# Patient Record
Sex: MALE | Race: WHITE | URBAN - METROPOLITAN AREA
[De-identification: names, ages, dates, MRNs, and addresses within clinical notes are randomized per-mention and may not be internally consistent; named-entity substitution may affect disease eponyms.]

---

## 2017-01-01 ENCOUNTER — APPOINTMENT (OUTPATIENT)
Dept: GENERAL RADIOLOGY | Facility: CLINIC | Age: 47
DRG: 441 | End: 2017-01-01
Attending: INTERNAL MEDICINE
Payer: COMMERCIAL

## 2017-01-01 ENCOUNTER — APPOINTMENT (OUTPATIENT)
Dept: GENERAL RADIOLOGY | Facility: CLINIC | Age: 47
DRG: 441 | End: 2017-01-01
Attending: EMERGENCY MEDICINE
Payer: COMMERCIAL

## 2017-01-01 ENCOUNTER — APPOINTMENT (OUTPATIENT)
Dept: ULTRASOUND IMAGING | Facility: CLINIC | Age: 47
DRG: 441 | End: 2017-01-01
Attending: INTERNAL MEDICINE
Payer: COMMERCIAL

## 2017-01-01 ENCOUNTER — APPOINTMENT (OUTPATIENT)
Dept: CARDIOLOGY | Facility: CLINIC | Age: 47
DRG: 441 | End: 2017-01-01
Attending: INTERNAL MEDICINE
Payer: COMMERCIAL

## 2017-01-01 ENCOUNTER — HOSPITAL ENCOUNTER (INPATIENT)
Facility: CLINIC | Age: 47
LOS: 1 days | DRG: 441 | End: 2017-02-22
Attending: EMERGENCY MEDICINE | Admitting: SURGERY
Payer: COMMERCIAL

## 2017-01-01 ENCOUNTER — APPOINTMENT (OUTPATIENT)
Dept: CT IMAGING | Facility: CLINIC | Age: 47
DRG: 441 | End: 2017-01-01
Attending: EMERGENCY MEDICINE
Payer: COMMERCIAL

## 2017-01-01 VITALS
SYSTOLIC BLOOD PRESSURE: 123 MMHG | DIASTOLIC BLOOD PRESSURE: 62 MMHG | TEMPERATURE: 93.4 F | RESPIRATION RATE: 33 BRPM | WEIGHT: 304.9 LBS | OXYGEN SATURATION: 38 %

## 2017-01-01 DIAGNOSIS — E86.0 DEHYDRATION: ICD-10-CM

## 2017-01-01 DIAGNOSIS — R09.2 RESPIRATORY ARREST (H): ICD-10-CM

## 2017-01-01 DIAGNOSIS — K72.91: ICD-10-CM

## 2017-01-01 DIAGNOSIS — I46.9 CARDIAC ARREST (H): ICD-10-CM

## 2017-01-01 DIAGNOSIS — K92.0 GASTROINTESTINAL HEMORRHAGE WITH HEMATEMESIS: ICD-10-CM

## 2017-01-01 DIAGNOSIS — N17.9 ACUTE KIDNEY INJURY (H): ICD-10-CM

## 2017-01-01 DIAGNOSIS — D64.9 ANEMIA, UNSPECIFIED TYPE: ICD-10-CM

## 2017-01-01 LAB
ABO + RH BLD: NORMAL
ABO + RH BLD: NORMAL
ALBUMIN SERPL-MCNC: 1.9 G/DL (ref 3.4–5)
ALBUMIN SERPL-MCNC: 2.2 G/DL (ref 3.4–5)
ALBUMIN UR-MCNC: 30 MG/DL
ALP SERPL-CCNC: 319 U/L (ref 40–150)
ALP SERPL-CCNC: 325 U/L (ref 40–150)
ALT SERPL W P-5'-P-CCNC: 108 U/L (ref 0–70)
ALT SERPL W P-5'-P-CCNC: 53 U/L (ref 0–70)
AMMONIA PLAS-SCNC: 81 UMOL/L (ref 10–50)
AMPHETAMINES UR QL SCN: NORMAL
ANION GAP SERPL CALCULATED.3IONS-SCNC: 25 MMOL/L (ref 3–14)
ANION GAP SERPL CALCULATED.3IONS-SCNC: 29 MMOL/L (ref 3–14)
APAP SERPL-MCNC: NORMAL MG/L (ref 10–20)
APPEARANCE UR: ABNORMAL
APTT PPP: 47 SEC (ref 22–37)
APTT PPP: 56 SEC (ref 22–37)
AST SERPL W P-5'-P-CCNC: 1556 U/L (ref 0–45)
AST SERPL W P-5'-P-CCNC: 551 U/L (ref 0–45)
BARBITURATES UR QL: NORMAL
BASE DEFICIT BLDA-SCNC: 21 MMOL/L
BASE DEFICIT BLDA-SCNC: 21.4 MMOL/L
BASE DEFICIT BLDA-SCNC: 22 MMOL/L
BASOPHILS # BLD AUTO: 0.1 10E9/L (ref 0–0.2)
BASOPHILS NFR BLD AUTO: 0.6 %
BENZODIAZ UR QL: NORMAL
BILIRUB SERPL-MCNC: 21.2 MG/DL (ref 0.2–1.3)
BILIRUB SERPL-MCNC: 22.5 MG/DL (ref 0.2–1.3)
BILIRUB UR QL STRIP: ABNORMAL
BLD GP AB SCN SERPL QL: NORMAL
BLD PROD TYP BPU: NORMAL
BLD UNIT ID BPU: 0
BLD UNIT ID BPU: 0
BLOOD BANK CMNT PATIENT-IMP: NORMAL
BLOOD PRODUCT CODE: NORMAL
BLOOD PRODUCT CODE: NORMAL
BPU ID: NORMAL
BPU ID: NORMAL
BUN SERPL-MCNC: 43 MG/DL (ref 7–30)
BUN SERPL-MCNC: 46 MG/DL (ref 7–30)
CA-I BLD-MCNC: 3.7 MG/DL (ref 4.4–5.2)
CALCIUM SERPL-MCNC: 6.7 MG/DL (ref 8.5–10.1)
CALCIUM SERPL-MCNC: 7.4 MG/DL (ref 8.5–10.1)
CANNABINOIDS UR QL SCN: NORMAL
CHLORIDE SERPL-SCNC: 86 MMOL/L (ref 94–109)
CHLORIDE SERPL-SCNC: 91 MMOL/L (ref 94–109)
CK SERPL-CCNC: 548 U/L (ref 30–300)
CO2 SERPL-SCNC: 14 MMOL/L (ref 20–32)
CO2 SERPL-SCNC: 8 MMOL/L (ref 20–32)
COCAINE UR QL: NORMAL
COLOR UR AUTO: ABNORMAL
CREAT SERPL-MCNC: 2.9 MG/DL (ref 0.66–1.25)
CREAT SERPL-MCNC: 3.2 MG/DL (ref 0.66–1.25)
DIFFERENTIAL METHOD BLD: ABNORMAL
EOSINOPHIL # BLD AUTO: 0 10E9/L (ref 0–0.7)
EOSINOPHIL NFR BLD AUTO: 0 %
ERYTHROCYTE [DISTWIDTH] IN BLOOD BY AUTOMATED COUNT: 17.2 % (ref 10–15)
ERYTHROCYTE [DISTWIDTH] IN BLOOD BY AUTOMATED COUNT: 18.4 % (ref 10–15)
ETHANOL SERPL-MCNC: 0.3 G/DL
FIBRINOGEN PPP-MCNC: 583 MG/DL (ref 200–420)
GFR SERPL CREATININE-BSD FRML MDRD: 21 ML/MIN/1.7M2
GFR SERPL CREATININE-BSD FRML MDRD: 24 ML/MIN/1.7M2
GLUCOSE BLDC GLUCOMTR-MCNC: 140 MG/DL (ref 70–99)
GLUCOSE BLDC GLUCOMTR-MCNC: 82 MG/DL (ref 70–99)
GLUCOSE BLDC GLUCOMTR-MCNC: 90 MG/DL (ref 70–99)
GLUCOSE SERPL-MCNC: 73 MG/DL (ref 70–99)
GLUCOSE SERPL-MCNC: 77 MG/DL (ref 70–99)
GLUCOSE UR STRIP-MCNC: 30 MG/DL
HCO3 BLD-SCNC: 10 MMOL/L (ref 21–28)
HCO3 BLD-SCNC: 7 MMOL/L (ref 21–28)
HCO3 BLD-SCNC: 8 MMOL/L (ref 21–28)
HCT VFR BLD AUTO: 26.1 % (ref 40–53)
HCT VFR BLD AUTO: 28.1 % (ref 40–53)
HGB BLD-MCNC: 8.3 G/DL (ref 13.3–17.7)
HGB BLD-MCNC: 8.5 G/DL (ref 13.3–17.7)
HGB BLD-MCNC: 8.7 G/DL (ref 13.3–17.7)
HGB UR QL STRIP: ABNORMAL
HYALINE CASTS #/AREA URNS LPF: 2 /LPF (ref 0–2)
IMM GRANULOCYTES # BLD: 0.2 10E9/L (ref 0–0.4)
IMM GRANULOCYTES NFR BLD: 1.4 %
INR PPP: 1.58 (ref 0.86–1.14)
INR PPP: 1.83 (ref 0.86–1.14)
KETONES BLD-SCNC: 3.4 MMOL/L (ref 0–0.6)
KETONES UR STRIP-MCNC: 10 MG/DL
LACTATE BLD-SCNC: 17 MMOL/L (ref 0.7–2.1)
LACTATE BLD-SCNC: 17 MMOL/L (ref 0.7–2.1)
LACTATE BLD-SCNC: 19 MMOL/L (ref 0.7–2.1)
LEUKOCYTE ESTERASE UR QL STRIP: ABNORMAL
LIPASE SERPL-CCNC: 3641 U/L (ref 73–393)
LYMPHOCYTES # BLD AUTO: 1.1 10E9/L (ref 0.8–5.3)
LYMPHOCYTES NFR BLD AUTO: 7 %
MAGNESIUM SERPL-MCNC: 3.1 MG/DL (ref 1.6–2.3)
MCH RBC QN AUTO: 34.8 PG (ref 26.5–33)
MCH RBC QN AUTO: 35.6 PG (ref 26.5–33)
MCHC RBC AUTO-ENTMCNC: 31 G/DL (ref 31.5–36.5)
MCHC RBC AUTO-ENTMCNC: 31.8 G/DL (ref 31.5–36.5)
MCV RBC AUTO: 112 FL (ref 78–100)
MCV RBC AUTO: 112 FL (ref 78–100)
MONOCYTES # BLD AUTO: 0.6 10E9/L (ref 0–1.3)
MONOCYTES NFR BLD AUTO: 3.5 %
MUCOUS THREADS #/AREA URNS LPF: PRESENT /LPF
NEUTROPHILS # BLD AUTO: 13.9 10E9/L (ref 1.6–8.3)
NEUTROPHILS NFR BLD AUTO: 87.5 %
NITRATE UR QL: NEGATIVE
NT-PROBNP SERPL-MCNC: 274 PG/ML (ref 0–450)
NUM BPU REQUESTED: 2
O2/TOTAL GAS SETTING VFR VENT: 100 %
OPIATES UR QL SCN: NORMAL
OXYHGB MFR BLD: 78 % (ref 92–100)
OXYHGB MFR BLD: 98 % (ref 92–100)
OXYHGB MFR BLD: 99 % (ref 92–100)
PCO2 BLD: 26 MM HG (ref 35–45)
PCO2 BLD: 33 MM HG (ref 35–45)
PCO2 BLD: 58 MM HG (ref 35–45)
PCP UR QL SCN: NORMAL
PH BLD: 6.85 PH (ref 7.35–7.45)
PH BLD: 6.97 PH (ref 7.35–7.45)
PH BLD: 7.05 PH (ref 7.35–7.45)
PH UR STRIP: 5.5 PH (ref 5–7)
PHOSPHATE SERPL-MCNC: 7.3 MG/DL (ref 2.5–4.5)
PLATELET # BLD AUTO: 132 10E9/L (ref 150–450)
PLATELET # BLD AUTO: 164 10E9/L (ref 150–450)
PO2 BLD: 170 MM HG (ref 80–105)
PO2 BLD: 268 MM HG (ref 80–105)
PO2 BLD: 82 MM HG (ref 80–105)
POTASSIUM SERPL-SCNC: 5 MMOL/L (ref 3.4–5.3)
POTASSIUM SERPL-SCNC: 5.9 MMOL/L (ref 3.4–5.3)
PROT SERPL-MCNC: 5.2 G/DL (ref 6.8–8.8)
PROT SERPL-MCNC: 5.5 G/DL (ref 6.8–8.8)
RBC # BLD AUTO: 2.33 10E12/L (ref 4.4–5.9)
RBC # BLD AUTO: 2.5 10E12/L (ref 4.4–5.9)
RBC #/AREA URNS AUTO: 15 /HPF (ref 0–2)
SALICYLATES SERPL-MCNC: NORMAL MG/DL
SODIUM SERPL-SCNC: 125 MMOL/L (ref 133–144)
SODIUM SERPL-SCNC: 128 MMOL/L (ref 133–144)
SP GR UR STRIP: 1.02 (ref 1–1.03)
SPECIMEN EXP DATE BLD: NORMAL
SQUAMOUS #/AREA URNS AUTO: 1 /HPF (ref 0–1)
TRANSFUSION STATUS PATIENT QL: NORMAL
TROPONIN I SERPL-MCNC: 0.04 UG/L (ref 0–0.04)
TROPONIN I SERPL-MCNC: 0.08 UG/L (ref 0–0.04)
URN SPEC COLLECT METH UR: ABNORMAL
UROBILINOGEN UR STRIP-MCNC: 2 MG/DL (ref 0–2)
WBC # BLD AUTO: 12 10E9/L (ref 4–11)
WBC # BLD AUTO: 15.9 10E9/L (ref 4–11)
WBC #/AREA URNS AUTO: <1 /HPF (ref 0–2)

## 2017-01-01 PROCEDURE — 87186 SC STD MICRODIL/AGAR DIL: CPT | Performed by: INTERNAL MEDICINE

## 2017-01-01 PROCEDURE — 80329 ANALGESICS NON-OPIOID 1 OR 2: CPT | Performed by: EMERGENCY MEDICINE

## 2017-01-01 PROCEDURE — 85027 COMPLETE CBC AUTOMATED: CPT | Performed by: INTERNAL MEDICINE

## 2017-01-01 PROCEDURE — 86900 BLOOD TYPING SEROLOGIC ABO: CPT | Performed by: EMERGENCY MEDICINE

## 2017-01-01 PROCEDURE — 71010 XR CHEST 1 VW: CPT

## 2017-01-01 PROCEDURE — 40000264 ECHO LIMITED WITH LUMASON

## 2017-01-01 PROCEDURE — 82805 BLOOD GASES W/O2 SATURATION: CPT | Performed by: EMERGENCY MEDICINE

## 2017-01-01 PROCEDURE — 93308 TTE F-UP OR LMTD: CPT | Mod: 26 | Performed by: INTERNAL MEDICINE

## 2017-01-01 PROCEDURE — 86850 RBC ANTIBODY SCREEN: CPT | Performed by: EMERGENCY MEDICINE

## 2017-01-01 PROCEDURE — 70450 CT HEAD/BRAIN W/O DYE: CPT

## 2017-01-01 PROCEDURE — 36556 INSERT NON-TUNNEL CV CATH: CPT | Performed by: INTERNAL MEDICINE

## 2017-01-01 PROCEDURE — 87181 SC STD AGAR DILUTION PER AGT: CPT | Performed by: INTERNAL MEDICINE

## 2017-01-01 PROCEDURE — 84100 ASSAY OF PHOSPHORUS: CPT | Performed by: INTERNAL MEDICINE

## 2017-01-01 PROCEDURE — 82330 ASSAY OF CALCIUM: CPT | Performed by: INTERNAL MEDICINE

## 2017-01-01 PROCEDURE — 76775 US EXAM ABDO BACK WALL LIM: CPT

## 2017-01-01 PROCEDURE — 40000940 XR CHEST PORT 1 VW

## 2017-01-01 PROCEDURE — 82140 ASSAY OF AMMONIA: CPT | Performed by: EMERGENCY MEDICINE

## 2017-01-01 PROCEDURE — 5A1935Z RESPIRATORY VENTILATION, LESS THAN 24 CONSECUTIVE HOURS: ICD-10-PCS | Performed by: EMERGENCY MEDICINE

## 2017-01-01 PROCEDURE — 40000275 ZZH STATISTIC RCP TIME EA 10 MIN

## 2017-01-01 PROCEDURE — 93005 ELECTROCARDIOGRAM TRACING: CPT

## 2017-01-01 PROCEDURE — 86923 COMPATIBILITY TEST ELECTRIC: CPT | Performed by: EMERGENCY MEDICINE

## 2017-01-01 PROCEDURE — 25000125 ZZHC RX 250

## 2017-01-01 PROCEDURE — 99292 CRITICAL CARE ADDL 30 MIN: CPT | Performed by: INTERNAL MEDICINE

## 2017-01-01 PROCEDURE — 93010 ELECTROCARDIOGRAM REPORT: CPT | Performed by: INTERNAL MEDICINE

## 2017-01-01 PROCEDURE — 25000132 ZZH RX MED GY IP 250 OP 250 PS 637: Performed by: INTERNAL MEDICINE

## 2017-01-01 PROCEDURE — 76705 ECHO EXAM OF ABDOMEN: CPT

## 2017-01-01 PROCEDURE — 93325 DOPPLER ECHO COLOR FLOW MAPG: CPT | Mod: 26 | Performed by: INTERNAL MEDICINE

## 2017-01-01 PROCEDURE — 25000131 ZZH RX MED GY IP 250 OP 636 PS 637: Performed by: INTERNAL MEDICINE

## 2017-01-01 PROCEDURE — 87800 DETECT AGNT MULT DNA DIREC: CPT | Performed by: INTERNAL MEDICINE

## 2017-01-01 PROCEDURE — 84484 ASSAY OF TROPONIN QUANT: CPT | Performed by: INTERNAL MEDICINE

## 2017-01-01 PROCEDURE — 82805 BLOOD GASES W/O2 SATURATION: CPT | Performed by: INTERNAL MEDICINE

## 2017-01-01 PROCEDURE — 20000003 ZZH R&B ICU

## 2017-01-01 PROCEDURE — 25000128 H RX IP 250 OP 636: Performed by: INTERNAL MEDICINE

## 2017-01-01 PROCEDURE — 85610 PROTHROMBIN TIME: CPT | Performed by: INTERNAL MEDICINE

## 2017-01-01 PROCEDURE — 80320 DRUG SCREEN QUANTALCOHOLS: CPT | Performed by: EMERGENCY MEDICINE

## 2017-01-01 PROCEDURE — 83690 ASSAY OF LIPASE: CPT | Performed by: INTERNAL MEDICINE

## 2017-01-01 PROCEDURE — 36620 INSERTION CATHETER ARTERY: CPT | Performed by: INTERNAL MEDICINE

## 2017-01-01 PROCEDURE — P9016 RBC LEUKOCYTES REDUCED: HCPCS | Performed by: EMERGENCY MEDICINE

## 2017-01-01 PROCEDURE — 40000276 ZZH STATISTIC RCP TIME ED VENT EA 10 MIN

## 2017-01-01 PROCEDURE — 85384 FIBRINOGEN ACTIVITY: CPT | Performed by: INTERNAL MEDICINE

## 2017-01-01 PROCEDURE — 25000128 H RX IP 250 OP 636: Performed by: SURGERY

## 2017-01-01 PROCEDURE — 80053 COMPREHEN METABOLIC PANEL: CPT | Performed by: INTERNAL MEDICINE

## 2017-01-01 PROCEDURE — 25000125 ZZHC RX 250: Performed by: INTERNAL MEDICINE

## 2017-01-01 PROCEDURE — 84484 ASSAY OF TROPONIN QUANT: CPT | Performed by: EMERGENCY MEDICINE

## 2017-01-01 PROCEDURE — 25000125 ZZHC RX 250: Performed by: EMERGENCY MEDICINE

## 2017-01-01 PROCEDURE — 99291 CRITICAL CARE FIRST HOUR: CPT | Performed by: INTERNAL MEDICINE

## 2017-01-01 PROCEDURE — 25000128 H RX IP 250 OP 636: Performed by: EMERGENCY MEDICINE

## 2017-01-01 PROCEDURE — 74020 XR ABDOMEN PORT 2 VW: CPT

## 2017-01-01 PROCEDURE — 94002 VENT MGMT INPAT INIT DAY: CPT

## 2017-01-01 PROCEDURE — 36600 WITHDRAWAL OF ARTERIAL BLOOD: CPT

## 2017-01-01 PROCEDURE — 87076 CULTURE ANAEROBE IDENT EACH: CPT | Performed by: INTERNAL MEDICINE

## 2017-01-01 PROCEDURE — 99291 CRITICAL CARE FIRST HOUR: CPT | Mod: 25 | Performed by: INTERNAL MEDICINE

## 2017-01-01 PROCEDURE — 85025 COMPLETE CBC W/AUTO DIFF WBC: CPT | Performed by: EMERGENCY MEDICINE

## 2017-01-01 PROCEDURE — 87040 BLOOD CULTURE FOR BACTERIA: CPT | Performed by: INTERNAL MEDICINE

## 2017-01-01 PROCEDURE — 83880 ASSAY OF NATRIURETIC PEPTIDE: CPT | Performed by: EMERGENCY MEDICINE

## 2017-01-01 PROCEDURE — 93321 DOPPLER ECHO F-UP/LMTD STD: CPT | Mod: 26 | Performed by: INTERNAL MEDICINE

## 2017-01-01 PROCEDURE — 93308 TTE F-UP OR LMTD: CPT

## 2017-01-01 PROCEDURE — 87077 CULTURE AEROBIC IDENTIFY: CPT | Performed by: INTERNAL MEDICINE

## 2017-01-01 PROCEDURE — 83735 ASSAY OF MAGNESIUM: CPT | Performed by: INTERNAL MEDICINE

## 2017-01-01 PROCEDURE — 80053 COMPREHEN METABOLIC PANEL: CPT | Performed by: EMERGENCY MEDICINE

## 2017-01-01 PROCEDURE — 81001 URINALYSIS AUTO W/SCOPE: CPT | Performed by: EMERGENCY MEDICINE

## 2017-01-01 PROCEDURE — 82010 KETONE BODYS QUAN: CPT | Performed by: EMERGENCY MEDICINE

## 2017-01-01 PROCEDURE — 00000146 ZZHCL STATISTIC GLUCOSE BY METER IP

## 2017-01-01 PROCEDURE — 85730 THROMBOPLASTIN TIME PARTIAL: CPT | Performed by: INTERNAL MEDICINE

## 2017-01-01 PROCEDURE — 82550 ASSAY OF CK (CPK): CPT | Performed by: INTERNAL MEDICINE

## 2017-01-01 PROCEDURE — 86901 BLOOD TYPING SEROLOGIC RH(D): CPT | Performed by: EMERGENCY MEDICINE

## 2017-01-01 PROCEDURE — 80307 DRUG TEST PRSMV CHEM ANLYZR: CPT | Performed by: EMERGENCY MEDICINE

## 2017-01-01 PROCEDURE — 85018 HEMOGLOBIN: CPT | Performed by: EMERGENCY MEDICINE

## 2017-01-01 PROCEDURE — 83605 ASSAY OF LACTIC ACID: CPT | Performed by: INTERNAL MEDICINE

## 2017-01-01 PROCEDURE — 85730 THROMBOPLASTIN TIME PARTIAL: CPT | Performed by: EMERGENCY MEDICINE

## 2017-01-01 PROCEDURE — 40000008 ZZH STATISTIC AIRWAY CARE

## 2017-01-01 PROCEDURE — 25500064 ZZH RX 255 OP 636: Performed by: SURGERY

## 2017-01-01 PROCEDURE — 85610 PROTHROMBIN TIME: CPT | Performed by: EMERGENCY MEDICINE

## 2017-01-01 PROCEDURE — 83605 ASSAY OF LACTIC ACID: CPT | Performed by: EMERGENCY MEDICINE

## 2017-01-01 RX ORDER — PIPERACILLIN SODIUM, TAZOBACTAM SODIUM 4; .5 G/20ML; G/20ML
4.5 INJECTION, POWDER, LYOPHILIZED, FOR SOLUTION INTRAVENOUS ONCE
Status: COMPLETED | OUTPATIENT
Start: 2017-01-01 | End: 2017-01-01

## 2017-01-01 RX ORDER — PIPERACILLIN SODIUM, TAZOBACTAM SODIUM 3; .375 G/15ML; G/15ML
3.38 INJECTION, POWDER, LYOPHILIZED, FOR SOLUTION INTRAVENOUS EVERY 8 HOURS SCHEDULED
Status: DISCONTINUED | OUTPATIENT
Start: 2017-01-01 | End: 2017-01-01

## 2017-01-01 RX ORDER — NALOXONE HYDROCHLORIDE 0.4 MG/ML
.1-.4 INJECTION, SOLUTION INTRAMUSCULAR; INTRAVENOUS; SUBCUTANEOUS
Status: DISCONTINUED | OUTPATIENT
Start: 2017-01-01 | End: 2017-01-01

## 2017-01-01 RX ORDER — FENTANYL CITRATE 50 UG/ML
50 INJECTION, SOLUTION INTRAMUSCULAR; INTRAVENOUS EVERY 30 MIN PRN
Status: DISCONTINUED | OUTPATIENT
Start: 2017-01-01 | End: 2017-01-01

## 2017-01-01 RX ORDER — VECURONIUM BROMIDE 10 MG
0.1 VIAL (EA) INTRAVENOUS ONCE
Status: DISCONTINUED | OUTPATIENT
Start: 2017-01-01 | End: 2017-01-01

## 2017-01-01 RX ORDER — CHLORHEXIDINE GLUCONATE ORAL RINSE 1.2 MG/ML
15 SOLUTION DENTAL ONCE
Status: DISCONTINUED | OUTPATIENT
Start: 2017-01-01 | End: 2017-01-01

## 2017-01-01 RX ORDER — ONDANSETRON 2 MG/ML
4 INJECTION INTRAMUSCULAR; INTRAVENOUS EVERY 30 MIN PRN
Status: DISCONTINUED | OUTPATIENT
Start: 2017-01-01 | End: 2017-01-01

## 2017-01-01 RX ORDER — PROPOFOL 10 MG/ML
INJECTION, EMULSION INTRAVENOUS
Status: DISCONTINUED
Start: 2017-01-01 | End: 2017-01-01 | Stop reason: HOSPADM

## 2017-01-01 RX ORDER — ALBUTEROL SULFATE 90 UG/1
6 AEROSOL, METERED RESPIRATORY (INHALATION) EVERY 4 HOURS PRN
Status: DISCONTINUED | OUTPATIENT
Start: 2017-01-01 | End: 2017-01-01

## 2017-01-01 RX ORDER — FENTANYL CITRATE 50 UG/ML
100 INJECTION, SOLUTION INTRAMUSCULAR; INTRAVENOUS
Status: DISCONTINUED | OUTPATIENT
Start: 2017-01-01 | End: 2017-01-01 | Stop reason: HOSPADM

## 2017-01-01 RX ORDER — LIDOCAINE 40 MG/G
CREAM TOPICAL
Status: DISCONTINUED | OUTPATIENT
Start: 2017-01-01 | End: 2017-01-01

## 2017-01-01 RX ORDER — OCTREOTIDE ACETATE 50 UG/ML
50 INJECTION, SOLUTION INTRAVENOUS; SUBCUTANEOUS ONCE
Status: COMPLETED | OUTPATIENT
Start: 2017-01-01 | End: 2017-01-01

## 2017-01-01 RX ORDER — MEPERIDINE HYDROCHLORIDE 25 MG/ML
25-50 INJECTION INTRAMUSCULAR; INTRAVENOUS; SUBCUTANEOUS
Status: DISCONTINUED | OUTPATIENT
Start: 2017-01-01 | End: 2017-01-01

## 2017-01-01 RX ORDER — MIDAZOLAM (PF) 1 MG/ML IN 0.9 % SODIUM CHLORIDE INTRAVENOUS SOLUTION
1-8 CONTINUOUS
Status: DISCONTINUED | OUTPATIENT
Start: 2017-01-01 | End: 2017-01-01 | Stop reason: HOSPADM

## 2017-01-01 RX ORDER — CHLORHEXIDINE GLUCONATE ORAL RINSE 1.2 MG/ML
15 SOLUTION DENTAL 2 TIMES DAILY
Status: DISCONTINUED | OUTPATIENT
Start: 2017-01-01 | End: 2017-01-01

## 2017-01-01 RX ORDER — PHYTONADIONE 1 MG/.5ML
2 INJECTION, EMULSION INTRAMUSCULAR; INTRAVENOUS; SUBCUTANEOUS DAILY
Status: DISCONTINUED | OUTPATIENT
Start: 2017-01-01 | End: 2017-01-01

## 2017-01-01 RX ORDER — VECURONIUM BROMIDE 10 MG
0.05 VIAL (EA) INTRAVENOUS EVERY 30 MIN PRN
Status: DISCONTINUED | OUTPATIENT
Start: 2017-01-01 | End: 2017-01-01

## 2017-01-01 RX ADMIN — SODIUM CHLORIDE 8 MG/HR: 9 INJECTION, SOLUTION INTRAVENOUS at 01:33

## 2017-01-01 RX ADMIN — SODIUM CHLORIDE 1000 ML: 9 INJECTION, SOLUTION INTRAVENOUS at 02:09

## 2017-01-01 RX ADMIN — MINERAL OIL AND WHITE PETROLATUM: 150; 830 OINTMENT OPHTHALMIC at 07:05

## 2017-01-01 RX ADMIN — SODIUM BICARBONATE: 84 INJECTION, SOLUTION INTRAVENOUS at 07:16

## 2017-01-01 RX ADMIN — SODIUM BICARBONATE: 84 INJECTION, SOLUTION INTRAVENOUS at 07:12

## 2017-01-01 RX ADMIN — SODIUM CHLORIDE 1000 ML: 9 INJECTION, SOLUTION INTRAVENOUS at 01:33

## 2017-01-01 RX ADMIN — SODIUM BICARBONATE 50 MEQ: 84 INJECTION, SOLUTION INTRAVENOUS at 06:19

## 2017-01-01 RX ADMIN — FENTANYL CITRATE 100 MCG: 50 INJECTION, SOLUTION INTRAMUSCULAR; INTRAVENOUS at 11:27

## 2017-01-01 RX ADMIN — FOLIC ACID: 5 INJECTION, SOLUTION INTRAMUSCULAR; INTRAVENOUS; SUBCUTANEOUS at 01:46

## 2017-01-01 RX ADMIN — SODIUM BICARBONATE 50 MEQ: 84 INJECTION, SOLUTION INTRAVENOUS at 02:05

## 2017-01-01 RX ADMIN — Medication 2 MG/HR: at 07:22

## 2017-01-01 RX ADMIN — CHLORHEXIDINE GLUCONATE 15 ML: 1.2 RINSE ORAL at 10:41

## 2017-01-01 RX ADMIN — SODIUM CHLORIDE 1000 ML: 9 INJECTION, SOLUTION INTRAVENOUS at 01:26

## 2017-01-01 RX ADMIN — PIPERACILLIN AND TAZOBACTAM 4.5 G: 4; .5 INJECTION, POWDER, FOR SOLUTION INTRAVENOUS at 02:30

## 2017-01-01 RX ADMIN — PANTOPRAZOLE SODIUM 40 MG: 40 INJECTION, POWDER, FOR SOLUTION INTRAVENOUS at 01:33

## 2017-01-01 RX ADMIN — NOREPINEPHRINE BITARTRATE 0.03 MCG/KG/MIN: 1 INJECTION INTRAVENOUS at 03:40

## 2017-01-01 RX ADMIN — OCTREOTIDE ACETATE 50 MCG/HR: 200 INJECTION, SOLUTION INTRAVENOUS; SUBCUTANEOUS at 06:58

## 2017-01-01 RX ADMIN — SULFUR HEXAFLUORIDE 5 ML: KIT at 09:35

## 2017-01-01 RX ADMIN — Medication 0.03 MCG/KG/MIN: at 03:40

## 2017-01-01 RX ADMIN — OCTREOTIDE ACETATE 50 MCG: 50 INJECTION, SOLUTION INTRAVENOUS; SUBCUTANEOUS at 06:49

## 2017-01-01 ASSESSMENT — ENCOUNTER SYMPTOMS
NAUSEA: 0
COLOR CHANGE: 1
VOMITING: 0
SHORTNESS OF BREATH: 1
ABDOMINAL DISTENTION: 1
ROS GI COMMENTS: POSITIVE FOR BLACK STOOLS
CONFUSION: 1

## 2017-02-22 PROBLEM — I46.9 CARDIAC ARREST (H): Status: ACTIVE | Noted: 2017-01-01

## 2017-02-22 NOTE — PROGRESS NOTES
Discussed with wife his current medical status.      Code status changed to DNR.     Nigel Xie MD  Pulmonary and Critical Care  [facility]  Pager:  [phone]

## 2017-02-22 NOTE — ED NOTES
DATE:  2/22/2017   TIME OF RECEIPT FROM LAB:  0230  LAB TEST:  Lactic acid  LAB VALUE:  17  RESULTS GIVEN WITH READ-BACK TO (PROVIDER):  Dr. Rodriguez  TIME LAB VALUE REPORTED TO PROVIDER:   0234

## 2017-02-22 NOTE — ED NOTES
Bed: ED18  Expected date: 2/21/17  Expected time: 11:25 PM  Means of arrival: Ambulance  Comments:  [facility] 44M [address] ment. Sta./on hold

## 2017-02-22 NOTE — PROGRESS NOTES
Called by ED physician who gave me patient's ED history  Called ICU siomara who will evaluate patient in ICU  GB

## 2017-02-22 NOTE — H&P
[facility] ICU  - Initial Consultation    We have been asked by Dr. Knight to evaluate this patient in regards to  Chief Complaint   Patient presents with     Jaundice     wife called [phone] because patient is turning yellow and has not eaten for 3 days     Alcohol Intoxication         HPI:     This is a 46-year-old male but appears to be secondary to critical nature of his illness and intubated and currently sedated.  Per chart review patient initially presented secondary to some mild altered mental status and respiratory difficulties in the setting of chronic alcohol use.  He is a slow decline now since beginning January with decreased p.o. Intake and slow worsening of his respiratory status with increased difficulty breathing.   He was reported to have two large falls today without any reported significant traumatic injury until he fell on the table and a subsequent referral to floor.  At that time black stools are noted and I was called.  He received ketamine by EMS secondary to suspected agitation.  He has known chronic alcohol abuse with reportedly drinking large amounts of liquor.  Patient has not been seen in our systems since 2008.      During his evaluation he was getting a CT scan where he was from asystolic rhythm.  Compressions were started at 1:05 and compressions were stopped at 1:12.  There was significant aspiration with clearance of the airway and subsequent intubation..  He had a return of spontaneous circulation at 1:12 AM. Following the arrest  He had IV Protonix, normal saline, banana bag, sodium bicarbonate, And continued supportive cares. Chest x-ray confirmed the endotracheal tube approximately 3.7 cm above the jose.  There is loss of the left hemidiaphragm with small lung volumes and cardiomegaly.      Central line was placed in the emergency department.  Patient was hypothermic following the respiratory leading to cardiac arrest (presumed) disease.  The patient has made no purposeful  movement following the arrest.       Review of Systems:   Unable to obtain secondary to critical illness       Pertinent Medications     Per chart review no regularoutpatient medications    Current Facility-Administered Medications   Medication     lidocaine 1 % 1 mL     lidocaine (LMX4) cream     sodium chloride (PF) 0.9% PF flush 3 mL     sodium chloride (PF) 0.9% PF flush 3 mL     ondansetron (ZOFRAN) injection 4 mg     pantoprazole (PROTONIX) 80 mg in NaCl 0.9 % 100 mL infusion     propofol (DIPRIVAN) 1000 MG/100ML infusion     piperacillin-tazobactam (ZOSYN) 4.5 g vial to attach to  mL bag     norepinephrine (LEVOPHED) 16 mg in D5W 250 mL infusion     norepinephrine (LEVOPHED) 0.064 mg/mL 16 mg in D5W 250 mL infusion     Current Outpatient Prescriptions   Medication     MUCINEX D OR     NO ACTIVE MEDICATIONS        Allergies:      Allergies   Allergen Reactions     Sulfa Drugs Rash        Past Medical Hx:     Asthma    Obesity    Alcoholism    Tobacco use       Family Hx:   ETOH  CAD     Social Hx:   The patient has 22 pack year hx.  He has ongoing active use.  Alcohol use is 1/2 bottle hard liquor per day.           Objective     BP (!) 89/46  Temp 95  F (35  C)  Resp 23  Wt 127 kg (280 lb)  SpO2 99%    General:  Adult male;appears sedated; no acute distress; intubated  HEENT:  NCAT; pupils small, reactive to light, avani-orbital edema, dark black / red contents in NGT  Neck: Mild bleeding from right IJ CVC insertion site with pressure dressing in place  Pulm: Coarse BS bilateral  CV: RRR, no murmurs, rubs or gallops  Abdomen: Obese distended, no bowel sounds  Extremities:  + edema, no cyanosis  Neuro: No purposeful movement, no w/d from pain, pupils equal 2 mm minimally reactive to light    Pertinent Labs: All labs reviewed.  Most pertinent labs noted below    AB.85/58/82  Basic metabolic panel: 125/5/86/14/46/2.9  AST: 551  ALT: 53  Alkaline phosphatase: 319  Ammonia: 81  Lactic acid: 17  Troponin:  0.035    CBC: 15.9/8.3/132    Mean corpuscular volume:112    INR 1.58    Urinalysis: Glucose positive, large amount of bilirubin, 10 ketones, large blood, trace leukocyte esterase,, no WBC, 15 rbc's    Tylenol level negative, salicylate level negative    Drugs of abuse toxicology screen negative except for alcohol level of 0.30    Microbiology / Cultures:   Blood cultures: Not drawn    Imaging:  Imaging was reviewed by me personally.  Pertinent positives noted below.     CXR - Low lung volumes, loss of left hemidiaphragm, endotracheal tube 3.37 cm above the jose, cardiomegaly    CT head: No acute pathology    Assessment and Plan:   This is a 46-year-old male with alcohol intoxication and likely chronic liver disease who was brought in with altered mental status with subsequent  Presumed aspiration and respiratory arrest leading to a cardiac arrest with an initial rhythm of pulseless electrical activity.  CPR was performed for approximately seven minutes though I concern is the period of time without a perfusing rhythm may have been longer particularly given the profound acidosis of 6.8.  He is not making any purposeful movement and he does not withdraw from pain.  He has not received any sedation  The level of consciousness is beyond explanation of alcohol intoxication alone.  Given this I would recommend therapeutic hypothermia..  Neuro critical care consultation for the cardiac arrest.      He does have possible acute GI bleed with dark blood coming from the nasogastric tube which this may be traumatic secondary to tube placement but given the history of possible melena, presentation we will continue him on a Protonix drip. He has no known history of varices though he does remain at risk for this.    Neuro:   1.  AMS - This is likely multifactorial With initial etiology secondary to intoxication.  Given the nonspecific symptoms of hepatic encephalopathy is also a possibility.  Currently my concern is for anoxic  brain injury following a cardiac arrest.  As noted above no ICU consultation and therapeutic hypothermia will be instituted. CT head negative.  --> therapeutic hypothermia  --> CT head negative.   --> banana bag q day x 3 days given etoh hx    2.  Substance abuse - EtoH. Banana basilio in ED, continue daily x 3. Will need substance abuse couseling prior to d/c. He remains at high risk for alcohol withdrawal depending on how he does know next 24-48 hours.  Given the amount of alcohol use I would recommend consideration of prophylactic non-benzodiazepine treatment forpresumed Alcohol withdrawal.  This may be limited secondary to his  Hypotension.    Pulm:   1.  Acute hypoxemic and hypercapneic respiratory failure - Suspect 2/2 macro-aspiration which may be 2/2 intoxication, GI bleed, medications. Suspect macro-aspiration but given severity of illness will cont coverage for pna with zosyn. Increase RR and Vt given profound acidosis. Repeat ABG pending. Arterial line placement.   --> serial abg  --> serial lactic acids  --> AC / 24 / 600 / 8 / wean as able     CV:  1.  Hypotension - May be septic for following arrest, will trend troponin.  No ST elevations on EKG. Repeat EKG in AM. Continue levophed and supportive care. Abx for possible sepsis.     2.  Cardiac Arrest - PEA is reported rhythm at time of arrest. As noted 7 min CPR but profound acidosis concerning for more prolonged downtime. Echo in AM. Therapeutic hypothermia.   --> hypothermia  --> echo in am  --> serial troponins    Renal:   1.  ERIK - No known underlying CKD. ERIK 2/2 hypoperfusion from arrest. MAP goal 65.     2.  Mild respiratory and severe metabolic acidosis - Lactic acid 17 at time of arrest. Cont with hyperventilation for now. Serial ABG and lactic acid levels  --> serial labs    ID:  1.  Aspiration - possible PNA - Cont zosyn    GI:   1.  GI bleed - Concern for melena on presentation. IV protonix started. Hgb 8.3 w/o recent baseline.   --> PPI  gtt  --> if Hgb decreasing then pt at risk for variceal bleed, would start octreotide, - pending repeat labs  --> vitamin k    Heme / Onc:   1.  Macrocytic anemia - no acute intervention    Endocrine:   1.  Glc - controlled    Musculoskeletal:   1.  No acute issues.     Nutrition:   1.  NPO    Prophylaxis / Other:  DVT Prophylaxis:  Mechanical prophylaxis, pending evaluation for possible GI bleed  GI Prophylaxis: On PPI  Ventilator / PNA Prophylaxis:  HOB, oral cares, ICU protocol  Restraints: UE restraints   PTOT:  Not appropriate at this time    Disposition: MICU    Critical Care Time:  45 min    Nigel Xie MD  Pulmonary and Critical Care  [facility]  Pager:  [phone]

## 2017-02-22 NOTE — PROVIDER NOTIFICATION
Critical labs reported to Dr. Lundberg. Lactic Acid 19; pH 7.05; Bicarb 7. Awaiting further orders.

## 2017-02-22 NOTE — ED NOTES
Dr. Sloan and [facility] talked with wife over the phone, updated her on pt's condition and plan of care.

## 2017-02-22 NOTE — PROCEDURES
Procedure:  Central line placement   Indication: Cardiac arrest  Timeout: Performed  Consent: Implied consent, emergent placement, critical illness    Maneuver: The right femoral area was prepped with ChloraPrep ×4 and then draped in sterile fashion.  Maximum barrier technique was utilized.  A bedside ultrasound was used to visualize both the femoral artery and vein.  Next a guide needle was inserted with aspiration of nonpulsatile dark red blood.  The wire was inserted with minimal resistance.  The needle was removed and then the subcutaneous tissue was dilated with subsequent insertion of the catheter over the wire with minimal resistance.  The line was sutured in place.  All three ports were checked and flushed easily.    Estimated blood loss: 8 mL    Nigel Xie MD  Pulmonary and Critical Care  [facility]  Pager:  [phone]

## 2017-02-22 NOTE — ED PROVIDER NOTES
"  History     Chief Complaint:  Jaundice     HPI *History given by wife and sister due to patient's altered mental status.     Enoch Tavarez is a 46 year old male who presents via EMS for evaluation of jaundice in addition to confusion, trouble breathing, and fall.  The patient's wife states the patient has a longstanding history of alcohol abuse, reporting he drinks over half a bottle of Bacardi daily.  She reports she is unaware of any past medical history, reporting he has never been to the doctor as an adult.  Family reports since around New Years they have noticed the patient has had decreased appetite, though they report he has been gaining weight and his abdomen has been increasingly more protuberant.  His wife reports one week ago she noticed, \"He started becoming jaundiced, increasingly more bloated, and I've noticed he has been having a harder time breathing.\"  She reports within the past two days he has been slightly confused and has been falling more recently, stating he had a large fall two days ago, though he did not complain of any significant pain at that time and did not want to be evaluated.  She states tonight the patient fell on top of a table, which then crashed down to the floor.  She states he did not hit his head and was not complaining of injuries, though she reports while she was helping clean him up she noticed some very black stool and his sister decided to call 911.  His wife reports on medic arrival the patient was upset that EMS was called and, \"he was rough trying to get out of the house,\" therefore medics reportedly gave 400 of IM Ketamine.  The patient is currently sedated which his wife states was not the case prior to Ketamine from EMS.   The patient's wife states he has not eaten a full meal in three days and his last drink was most likely at some point prior to calling EMS.  His wife reports he has no reported history of diabetes, hypertension, or high cholesterol.  She " reports a family he has a history of alcoholism and notes possible history of CAD.  There has been no reported nausea or vomiting.  She states she has not noticed him complaining of abdominal pain.       Allergies:  Sulfa Drugs - Rash     Medications:    The patient is currently on no regular medications.       Past Medical History:    Asthma  Otitis Media   No reported history of CAD, MI, HTN, DM, HLD    Past Surgical History:    Left ear reconstruction x 3   Tonsillectomy and Adenoidectomy     Family History:  Family history of alcoholism  Possible history of CAD  No reported family history of MI at young age    Social History:  Relationship status:   The patient is a former smoker.  1.00 pack/day for 12 years. Quit 2008  The patient's wife reports daily alcohol use.  Half bottle Bacardi daily   The patient presents via EMS with wife and sister.     Review of Systems   Unable to perform ROS: Mental status change   Respiratory: Positive for shortness of breath.    Gastrointestinal: Positive for abdominal distention. Negative for nausea and vomiting.        Positive for black stools    Skin: Positive for color change.   Psychiatric/Behavioral: Positive for confusion.       Physical Exam     Patient Vitals for the past 24 hrs:  02/22/17 0345 (!) 74/45 95.2  F (35.1  C) - - - - -   02/22/17 0330 (!) 88/42 95.2  F (35.1  C) - - 26 100 % -   02/22/17 0315 92/43 95  F (35  C) - 67 13 100 % -   02/22/17 0310 - 95  F (35  C) - 66 23 99 % -   02/22/17 0300 - 94.6  F (34.8  C) - 67 13 99 % -   02/22/17 0255 (!) 89/46 94.6  F (34.8  C) - 68 11 99 % -   02/22/17 0250 (!) 87/46 94.6  F (34.8  C) - 69 13 99 % -   02/22/17 0245 90/45 94.8  F (34.9  C) - 71 15 99 % -   02/22/17 0230 94/46 94.6  F (34.8  C) - 72 11 98 % -   02/22/17 0219 - 94.5  F (34.7  C) - 74 13 100 % -   02/22/17 0218 92/50 - - - - - -   02/22/17 0215 (!) 78/47 - - 75 13 100 % -   02/22/17 0206 - 92.3  F (33.5  C) - - - - -   02/22/17 0205 (!) 84/48 - -  75 13 100 % -   02/22/17 0200 (!) 82/50 (!) 87.1  F (30.6  C) - 76 12 100 % -   02/22/17 0145 102/53 - - 74 (!) 6 100 % -   02/22/17 0140 - - - - - - 127 kg (280 lb)   02/22/17 0121 146/60 - - 64 - 92 % -   02/22/17 0100 110/72 - - - - - -   02/22/17 0030 (!) 89/65 - - 88 26 - -   02/22/17 0015 (!) 83/55 - - 89 25 91 % -   02/22/17 0009 (!) 83/57 - - 88 26 94 % -   02/21/17 2349 - - - 89 26 94 % -   02/21/17 2344 91/59 97.9  F (36.6  C) - - - - -   02/21/17 2343 - - - - (!) 32 94 % -       Physical Exam   Constitutional:  Jaundiced, tachypneic with sonorous respirations.  Eyes open, responds with movement to painful stimuli, does not speak, answer questions, or follow commands  HENT:   Head:    Atraumatic.   Mouth/Throat:   Oropharynx is without erythema or exudate. Dry oral mucosa  Eyes:    Conjunctivae normal and EOM are normal.      Pupils are equal, round, and reactive to light.   Neck:    Normal range of motion. Neck supple.   Cardiovascular:  Normal rate, regular rhythm, normal heart sounds and radial and dorsalis pedis pulses are 2+ and symmetric.    Pulmonary/Chest:  Tachypneic.  Decreased breath sounds both lung bases  Abdominal:   Distended protuberant abdomen. Bowel sounds are normal.    Musculoskeletal:  Normal range of motion. No tenderness. 2+ edema bilateral lower extremities  Neurological:  Alert. Eyes open, responds with movement to painful stimuli, does not speak, answer questions, or follow commands. GCS 11.   Skin:    Skin is warm and dry.     Emergency Department Course     ECG @ 0118  Indication: Asystole  Rate 73 bpm.   ID interval 162 ms.   QRS duration 94 ms.   QT/QTc 392/431 ms.   P-R-T axes 21.  Notes: Normal sinus rhythm.  Nonspecific ST abnormality.  No prior ECG  Time read 0118    Imaging:  Head CT, without contrast, as per radiology:   IMPRESSION: No evidence of acute intracranial abnormality.    Chest XR per radiology:   IMPRESSION: No convincing evidence of active cardiopulmonary  "disease.    Portable Chest XR per radiology:   IMPRESSION: Interval placement of an endotracheal tube with distal  tube tip approximately 2.5 cm proximal to the jose.    Laboratory:  CBC: WBC 15.9 (H) HGB 8.3 (L)  (L)   CMP: Cr 2.90 (H) Glucose 77 (WNL)  (L) Chloride 86 (L) Carbon Dioxide 14 (L) Anion Gap 25 (H) BUN 46 (H) GFR 24 (L) Calcium 7.4 (L) Bilirubin Total 22.5 (H) Albumin 2.2 (L) Protein Total 5.5 (L) Alkphos 319 (H)  (HH) Rest WNL  Ammonia (on ice): 81 (H)  INR: 1.58 (H)  PTT: 47 (H)  0115 Hemoglobin: 8.5 (L)  Blood gas arterial and oxyhgb: pH 6.85 (LL) pCO2 58 (H) pO2 82 (WNL) Bicarbonate 10 (LL) Oxyhemoglobin 78 (L) Rest WNL  Lactate: 17.0 (HH)   Ketone Quantitative: 3.4 (HH)  0147: Troponin I: 0.035 (WNL)  BNP: 274 (WNL)  0212: Glucose by Meter: 140 (H)    Blood alcohol: 0.30 (H)  Acetaminophen: <2 (WNL)  Salicylate: <2 (WNL)     Blood Type and Screen:  A Positive    Blood culture 1: Pending  Blood culture 2: Pending     Drug Abuse Screen: All Negative  UA: Cloudy, dark brown urine; Glucose 30 (A) Urinebilin Large (A) Ketone 10 (A) Blood Large (A) Albumin 30 (A) Leukocyte Esterase Trace (A)RBC 15 (H) Mucous Present (A) Rest WNL    Procedures:     Intubation      INDICATION: Acute respiratory failure. and Airway protection.    PERFORMED BY: Dr. Anaya    CONSENT: The procedure was performed in an emergent situation.    TIMEOUT: Immediately prior to procedure a \"time out\" was called to verify the correct patient, procedure, equipment, support staff and site/side marked as required.    INTUBATION METHOD: Glidescope    PATIENT STATUS: Respiratory and cardiac arrest.      PREOXYGENATION: Mask    PRETREATMENT MEDICATIONS: None    SEDATIVES: None    PARALYTIC: None    LARYNGOSCOPE SIZE: Mac    TUBE SIZE: 7.5 cuffed with cuff inflated after placement  Number of attempts: 1  Cricoid pressure: yes  Cords visualized: yes    POST-PROCEDURE ASSESSMENT: Chest x-ray interpreted by me " demonstrating endotracheal tube in appropriate position.    ETT TO TEETH: 24 cm  Tube secured with: ETT leon    Patient tolerated the procedure well with no immediate complications.  COMPLICATIONS:  Black emesis noted in airway.       Central Line Placement       PROCEDURE:  Central Line Placement with Ultrasound Guidance.    INDICATIONS: Vascular access, Central pressure monitoring    CONSENT: Risks (including but not limited to: pneumothorax,  bleeding, infection or artery puncture), benefits and alternatives were discussed with  unable to obtain due to emergency conditions and consent for procedure was obtained.    TIMEOUT: Universal protocol was followed. TIME OUT conducted just prior to starting procedure confirmed patient identity, site/side, procedure, patient position, and availability of correct equipment, and implants.?  Yes      MEDICATION: None    PROCEDURAL NOTE: Right Internal Jugular approach was selected and the right anterior neck was prepped, cleansed and draped in a sterile fashion.  Mask, gown and gloves were used per sterile protocol.  Patient was then placed into Trendelenburg position and lidocaine was used for local anesthesia.  Vascular probe with ultrasound was used in a sterile fashion for guidance.  Introducer needle was then used to gain access to the central venous circulation.  Using Seldinger technique the  Triple lumen catheter was placed.  Catheter port(s) were aspirated and flushed.  Central line was sutured in place and sterile dressing applied.   Appropriate placement was confirmed by x-ray and no pneumothorax was visualized.    PATIENT STATUS: Patient tolerated the procedure   well. There were minor complications of small amount of bleeding around the puncture site which required surgifoam and a pressure dressing.           Interventions:  0107 1 round Epi, IV injection   0110 1 round Epi, IV injection   0110 1 round Bicarbonate  0155 Magnesium, IV injection   0126 Normal  Saline, 1000 mL, IV injection  0133 Protonix, 40 mg push, IV injection   0133 Protonix, 40 mg push, IV injection   0133 Protonix Drip, IV injection   0133 Normal Saline, 1000 mL, IV injection  0146 Banana Bag, IV injection    0205 Sodium Bicarbonate, 50 mEq, IV injection   0209 Normal Saline, 1000 mL, IV injection  0230 Zosyn, 4.5 g, IV injection   0251 Two Units, Leukocyte Reduced Red Blood Cells  0340 Levophed Drip, IV injection   0344 Protonix Drip, IV injection     ED Course:  The patient arrived by ambulance. He was escorted to the ED where his vitals were measured and recorded.   Nursing notes and past medical history reviewed.   I performed a physical examination of the patient as documented above.  I explained the plan with the patient's wife who consents to this.   The patient underwent the workup as described above.    Patient becoming increasingly more alert on the way to CT.  0105 Patient found to be unresponsive and asystolic upon return from CT.  CODE BLUE activated.    0105 Compressions started  0107 1 round epi given.   0108  Dr. Anaya preparing to intubate. Black emesis noted in posterior oropharynx.   0108 RT Suctioning black emesis from posterior oropharynx.   0110 One round of Bicarb and a second round of Epi given.   0110 Compressions stopped.  Dr. Anaya performed an intubation, please see above note for details.   0110 Compressions resumed  0111 Magnesium ordered  0112 Compressions stopped  0112 Return of pulse and rhythm.  0115 Magnesium given.    0118 An ECG was obtained. Read by me.   0121 Patient moved to [facility].   0126 Normal Saline, 1000 mL, IV injection  0127 Portable XR arrives  0130 XR Read by me  0133 Suctioning by RT  0133 Protonix, 40 mg push, IV injection   0133 Protonix, 40 mg push, IV injection   0133 Protonix Drip, IV injection   0133 Normal Saline, 1000 mL, IV injection  0146 Banana Bag  0148 OG Tube placed, suction dark brown stomach contents.   0150 I spoke with the  patient's wife over the phone.  I personally reviewed the laboratory and imaging results with the Patient's wife over the phone and answered all related questions prior to admission.   0205 Sodium Bicarbonate, 50 mEq, IV injection   0209 Normal Saline, 1000 mL, IV injection  0209 Discussed the patient with Dr. Parry, who will admit the patient to an ICU bed for further monitoring, evaluation, and treatment.  0315 I placed a central line per the above procedure note. Levophed ordered.  0404  Patient taken to the ICU    Impression & Plan        Medical Decision Making:  Enoch Tavarez is a 46 year old male who presents to the emergency department today jaundiced and decreased level of consciousness.  His wife called 911 because he has been drinking alcohol heavily for a long time and has not eaten for the last three days.  He has become increasingly weak and has fallen.  She also has noted he has been more confused.  He was agitated for EMS and they gave him 400 mg of IM Ketamine.     Upon arrival in the ED he is awake with his eyes open.  He appears dissociated.  He does not answer questions or follow commands.  He is mildly tachypneic.  He is placed on oxygen and on a monitor.  On examination he looks dehydrated and has quite a distended abdomen.  An IV was established and laboratory were drawn.  IV fluids and a banana bag were ordered.  His white cell count returned elevated at 15.9.  His hemoglobin is low at 8.3.  The patient's wife reported she cleaned up black stools.  Protonix dose was given along with some Zofran.  The patient's INR is elevated at 1.58 with a PTT of 47.  Metabolic panel shows a markedly elevated bilirubin at 22.5.  Albumin and protein are low.  Alkphos and AST are elevated.  Sodium is low at 125 as is his chloride.  His potassium is normal at 5.  His anion gap is elevated at 25 with a carbon dioxide of 14.  BUN and creatinine are also elevated.      I suspect an alcoholic ketoacidosis.   There is no history of volatile alcohol ingestion.  There is no evidence of coingestion with salicylates or acetaminophen.  Ethanol level was high as 0.3.     I ordered a CT scan of the patient's head because of his confusion and the patient's wife's report of recent falls.  Nursing was prepping to take him to CT scan on the monitor but noted that he was more awake and alert and was talking to them.  They thought he could be transported off monitor to CT.      The CT scan looked unremarkable without signs of acute abnormality.  However, when the patient returned from CT he was noted to be unresponsive and asystolic.     Code Blue was called.  Dr. Alex Anaya assisted with intubation.  The patient was noted to have black emesis in posterior oropharynx.      CPR was started.  The patient was given Epi in divided doses as well as some Bicarb and Magnesium.  After this he returned to sinus rhythm and pulses were restored.      He was moved to the Stabe Room.  Additional IV fluids were given.  STAT hemoglobin returned looking stable at 8 but the patient's blood pressures slowly began to trend downward.  He was type and crossed with concern for GI bleeding and  blood was transfused.  Chest XR post intubation shows the ET tube to be in good position and there is no evidence for infiltrate or other acute abnormality.  I am suspicious, however, that the patient aspirated as that was the reason for his respiratory and then cardiac arrest.  Lungs were suctioned and thick vomitus was removed.  Blood gas showed a very low pH at 6.85, PCO2 of 58, and Bicarb was only 10. The rate of his ventilator was turned up and he received a dose of IV bicarb.  I gave him a dose of Zosyn for aspiration.  With his distended abdomen he may also have spontaneous bacterial peritonitis.      I talked to Dr. Parry, on call for critical care.  He agreed to accept the patient for admission.  At the time I talked to him the patient's blood  pressures were soft but holding steady in the 90s range.  Blood pressures began to fall and so a central line was placed so levophed could be started.  Additional Protonix was given and Protonix drip was started.  An OG tube was placed and dark brown, likely bloody, stomach contents were suctioned.     The patient's wife had already gone home prior to the respiratory and cardiac arrest.  I spoke to her by phone about the events and explained the patient's very critical condition with multisystem organ failure.  She had previously given consent for blood transfusion if this was needed and so I took that as her verbal consent.    The patient is transferred to the ICU in critical condition.     Critical Care time was 70 minutes for this patient excluding procedures.    Diagnosis:    ICD-10-CM   1. Respiratory arrest (H) R09.2   2. Cardiac arrest (H) I46.9   3. Liver failure with hepatic coma, unspecified chronicity (H) K72.91   4. Dehydration E86.0   5. Anemia, unspecified type D64.9   6. Acute kidney injury (H) N17.9   7. Gastrointestinal hemorrhage with hematemesis K92.0   8. Alcohol intoxication in Alcoholism  Disposition:   Admit to an ICU bed under the care of Dr. Parry.       I, Josh Knight, am serving as a scribe on 2/22/2017 at 12:02 AM to personally document services performed by Hung Rodriguez MD, based on my observations and the provider's statements to me.         Hung Rodriguez MD  02/22/17 1039

## 2017-02-22 NOTE — PROGRESS NOTES
Patient compassionately extubated to room air at 1130 per MD order.    Carmen Tamayo, RRT  2/22/2017

## 2017-02-22 NOTE — ED NOTES
Assumed care during code/stabilization from RN Les pt had ROSC s/p CPR and intubations. See code sheet.

## 2017-02-22 NOTE — PROCEDURES
Procedure: Arterial line  Indication: Hypotension, blood pressure monitoring  Timeout: Performed  Consent: Emergent placement required, implied consent    Maneuver: The right wrist was prepped with ChloraPrep ×3 and then draped in sterile fashion.  Maximum barrier technique was utilized.  The needle was inserted over the right radial pulse.  Once the radial artery was cannulated there was flow of pulsatile bright red blood.  The wire was inserted with minimal resistance.  The needle was removed and the catheter was placed over the wire with minimal resistance and connected to a transducer.  An arterial waveform was noted.  The line was sutured in place.  A sterile dressing was placed over the insertion site.    Estimated blood loss: 2 mL    Nigel Xie MD  Pulmonary and Critical Care  [facility]  Pager:  [phone]

## 2017-02-22 NOTE — PLAN OF CARE
Problem: Goals, Interdisciplinary (Cardiac Impairment)  Goal: Interdisciplinary Goals  Outcome: Declining  Pt arrived from ED approx 0430 on levophed @ 0.13mcg and protonix gtt. Unresponsive to any stimuli. Pupils 1 and extremely sluggish. Eyes jaundiced with scleral edema. Dr. Xie here to place lines and direct care. Hypothermia protocol started at 0520 with pt reaching goal temp @ 0620. Minimal out dk concentrated U/O. Pt given 2 amps HCO3 and started on HCO3 gtt after receiving 2mg vitamin K. Abd extremely distended. Cont to monitor.

## 2017-02-22 NOTE — DISCHARGE SUMMARY
[facility] Discharge Summary    Enoch Tavarez MRN# [id number]   Age: 46 year old YOB: 1970     Date of Admission:  2017  Date of Discharge::  2017.  Admitting Physician:  Girish Parry MD  Discharge Physician:  Annemarie Lundberg MD  Primary Physician: Doctor, None         Admission Diagnoses:   Confusion and jaundice          Discharge Diagnosis:   Principle diagnosis: Multiorgan system failure  Secondary diagnoses:  Cirrhosis  Acute anuric kidney failure  Lactic Acidosis  Acute hypoxic respiratory failure  Septic shock  Asystolic cardiac arrest  Encephalopathy  Coagulopathy  Alcoholism  Anemia          Procedures:   Intubation and mechanical ventilation  Central line  Arterial line  Abdominal ultrasound  CXR  AXR  Head CT         Allergies:      Allergies   Allergen Reactions     Sulfa Drugs Rash             Medications Prior to Admission:       No current facility-administered medications on file prior to encounter.   Current Outpatient Prescriptions on File Prior to Encounter:  MUCINEX D OR None Entered   NO ACTIVE MEDICATIONS .           Brief History of Presenting Illness:   Brought to ED by wife for confusion, falls and general decline in health over the last few weeks.  Mental status very abnormal in ED and had an asystolic arrest during his ED evaluation.          Hospital Course:   Admitted to ICU after being stabilized including 7 minutes of CPR.   Clinical status remained extremely unstable with pressor dependence, severe lactic acidosis, anuria. Abdominal ultrasound showed cirrhosis. Spoke at length with his family who requested withdrawal of care.  He was compassionately extubated and  16 minutes later.          Pending Tests at Discharge:   Infectious workup          Discharge Disposition:   Patient  during this admission      Attestation:  Amount of time spent providing critical care service today: 55 minutes.    Annemarie Lundberg MD

## 2017-02-28 LAB
BACTERIA SPEC CULT: NO GROWTH
Lab: NORMAL
MICRO REPORT STATUS: NORMAL
SPECIMEN SOURCE: NORMAL

## 2017-03-01 LAB
BACTERIA SPEC CULT: ABNORMAL
Lab: ABNORMAL
MICRO REPORT STATUS: ABNORMAL
MICROORGANISM SPEC CULT: ABNORMAL
MICROORGANISM SPEC CULT: ABNORMAL
SPECIMEN SOURCE: ABNORMAL

## 2017-03-09 LAB — INTERPRETATION ECG - MUSE: NORMAL
